# Patient Record
Sex: FEMALE | Race: WHITE | Employment: UNEMPLOYED | URBAN - METROPOLITAN AREA
[De-identification: names, ages, dates, MRNs, and addresses within clinical notes are randomized per-mention and may not be internally consistent; named-entity substitution may affect disease eponyms.]

---

## 2017-01-30 ENCOUNTER — HOSPITAL ENCOUNTER (EMERGENCY)
Age: 32
Discharge: HOME | End: 2017-01-30
Attending: EMERGENCY MEDICINE
Payer: MEDICAID

## 2017-01-30 VITALS
WEIGHT: 283 LBS | HEIGHT: 63 IN | BODY MASS INDEX: 50.14 KG/M2 | RESPIRATION RATE: 18 BRPM | DIASTOLIC BLOOD PRESSURE: 49 MMHG | HEART RATE: 106 BPM | SYSTOLIC BLOOD PRESSURE: 133 MMHG | TEMPERATURE: 98 F | OXYGEN SATURATION: 98 %
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
## 2017-01-30 DIAGNOSIS — S39.012A BACK STRAIN, INITIAL ENCOUNTER: ICD-10-CM

## 2017-01-30 PROCEDURE — 99283 EMERGENCY DEPT VISIT LOW MDM: CPT

## 2017-01-30 RX ORDER — CYCLOBENZAPRINE HCL 10 MG
10 TABLET ORAL 3 TIMES DAILY PRN
Qty: 20 TABLET | Refills: 0 | Status: SHIPPED | OUTPATIENT
Start: 2017-01-30 | End: 2017-02-06

## 2017-01-30 RX ORDER — CYCLOBENZAPRINE HCL 10 MG
10 TABLET ORAL ONCE
Status: COMPLETED | OUTPATIENT
Start: 2017-01-30 | End: 2017-01-30
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
## 2024-11-01 ENCOUNTER — HOSPITAL ENCOUNTER (OUTPATIENT)
Age: 39
Discharge: HOME | End: 2024-11-01
Payer: MEDICAID

## 2024-11-01 ENCOUNTER — APPOINTMENT (OUTPATIENT)
Dept: GENERAL RADIOLOGY | Age: 39
End: 2024-11-01
Attending: NURSE PRACTITIONER
Payer: MEDICAID

## 2024-11-01 VITALS
HEART RATE: 90 BPM | OXYGEN SATURATION: 98 % | TEMPERATURE: 98 F | DIASTOLIC BLOOD PRESSURE: 100 MMHG | RESPIRATION RATE: 20 BRPM | WEIGHT: 293 LBS | BODY MASS INDEX: 51.91 KG/M2 | HEIGHT: 63 IN | SYSTOLIC BLOOD PRESSURE: 139 MMHG

## 2024-11-01 DIAGNOSIS — S93.402A SPRAIN OF LEFT ANKLE, UNSPECIFIED LIGAMENT, INITIAL ENCOUNTER: Primary | ICD-10-CM

## 2024-11-01 PROCEDURE — 99205 OFFICE O/P NEW HI 60 MIN: CPT

## 2024-11-01 PROCEDURE — 73610 X-RAY EXAM OF ANKLE: CPT | Performed by: NURSE PRACTITIONER

## 2024-11-01 PROCEDURE — 99204 OFFICE O/P NEW MOD 45 MIN: CPT

## 2024-11-01 NOTE — ED INITIAL ASSESSMENT (HPI)
Yesterday, pt rolled onto her left ankle after a misstep on uneven ground. C/o pain and swelling. Pt is non weight bearing on the left foot.

## 2024-11-02 NOTE — ED PROVIDER NOTES
Patient Seen in: [facility]      History     Chief Complaint   Patient presents with    Ankle Pain     Stated Complaint: 1/2 left leg pain    Subjective:   The history is provided by the patient.       Patient is a 39-year-old female with diabetes and obesity here for evaluation of left ankle pain.  Patient was walking on uneven ground this morning when she invertedly rolled her ankle.  Since injury, patient has had lateral ankle swelling, pain with palpation and weightbearing activity.  Has taken Tylenol with minimal relief in pain.  Differentials include but are not limited to sprain versus fracture.      Objective:     Past Medical History:    Diabetes (HCC)    Fatty liver    Hyperlipidemia              Past Surgical History:   Procedure Laterality Date    Other  GASTRIC SLEEVE    Skin tissue procedure unlisted                  Social History     Socioeconomic History    Marital status: Single   Tobacco Use    Smoking status: Never     Passive exposure: Never    Smokeless tobacco: Never   Vaping Use    Vaping status: Never Used   Substance and Sexual Activity    Alcohol use: No    Drug use: No   Other Topics Concern    Caffeine Concern No    Exercise Yes     Comment: walking 7x a wk     Social Drivers of Health      Received from [facility]    Housing Stability              Review of Systems    Positive for stated complaint: 1/2 left leg pain  Other systems are as noted in HPI.  Constitutional and vital signs reviewed.      All other systems reviewed and negative except as noted above.    Physical Exam     ED Triage Vitals [11/01/24 1819]   BP (!) 139/100   Pulse 90   Resp 20   Temp 98.2 °F (36.8 °C)   Temp src Oral   SpO2 98 %   O2 Device None (Room air)       Current Vitals:   Vital Signs  BP: (!) 139/100  Pulse: 90  Resp: 20  Temp: 98.2 °F (36.8 °C)  Temp src: Oral    Oxygen Therapy  SpO2: 98 %  O2 Device: None (Room air)        Physical Exam  Vitals and nursing note  reviewed.   Constitutional:       General: She is not in acute distress.     Appearance: She is obese. She is not ill-appearing, toxic-appearing or diaphoretic.   HENT:      Mouth/Throat:      Mouth: Mucous membranes are moist.   Eyes:      Conjunctiva/sclera: Conjunctivae normal.      Pupils: Pupils are equal, round, and reactive to light.   Cardiovascular:      Rate and Rhythm: Normal rate.      Pulses: Normal pulses.   Pulmonary:      Effort: Pulmonary effort is normal.   Musculoskeletal:      Right lower leg: Normal.      Right ankle: Tenderness present over the lateral malleolus and posterior TF ligament. Decreased range of motion. Anterior drawer test negative. Normal pulse.      Right Achilles Tendon: Normal.      Right foot: Normal.   Neurological:      Mental Status: She is alert.           ED Course   Labs Reviewed - No data to display  XR ANKLE (MIN 3 VIEWS), LEFT (CPT=73610)    Result Date: 11/1/2024  CONCLUSION:  There is soft tissue swelling about the left ankle most pronounced overlying the lateral malleolus.  No acute fracture or dislocation visualized.   LOCATION:  [id number]   Dictated by (CST): Shelby Clifford MD on 11/01/2024 at 8:15 PM     Finalized by (CST): Shelby Clifford MD on 11/01/2024 at 8:15 PM                  [facility]            Medical Decision Making  Differentials include but are not limited to ankle sprain versus fracture.  X-rays unremarkable for acute findings which I personally reviewed, I do not observe obvious fracture or dislocation.  Ace bandage applied.  Crutches for mobility assistance.  Plan for supportive treatment including rest, ice, Tylenol and elevation.  Close outpatient follow-up with Ortho as needed.  Patient agrees with plan of care.  All questions answered to patient's satisfaction.    Amount and/or Complexity of Data Reviewed  Radiology: ordered and independent interpretation performed. Decision-making details documented in ED Course.        Disposition and Plan      Clinical Impression:  1. Sprain of left ankle, unspecified ligament, initial encounter         Disposition:  Discharge  11/1/2024  8:17 pm    Follow-up:  Rayna Francis, PA  [address] [place] [id number] [phone]    Schedule an appointment as soon as possible for a visit   As needed          Medications Prescribed:  Discharge Medication List as of 11/1/2024  8:34 PM              Supplementary Documentation:

## (undated) NOTE — ED AVS SNAPSHOT
THE [facility] Emergency Department in [address] [facility]    Phone:  [phone]    Fax:  [address]   MRN: [id number]    Department:  THE [facility] Emergency Department in [place]   Date of Visit: IF THERE IS ANY CHANGE OR WORSENING OF YOUR CONDITION, CALL YOUR PRIMARY CARE PHYSICIAN AT ONCE OR RETURN IMMEDIATELY TO THE EMERGENCY DEPARTMENT.     If you have been prescribed any medication(s), please fill your prescription right away and begin taking t

## (undated) NOTE — ED AVS SNAPSHOT
THE [facility] Emergency Department in [address] [facility]    Phone:  [phone]    Fax:  [address]   MRN: [id number]    Department:  THE [facility] Emergency Department in [place]   Date of Visit: Follow up with your primary care doctor  Return to ER if any worsening symptoms or new concern    Disclosure     Insurance plans vary and the physician(s) referred by the ER may not be covered by your plan.  Please contact your insurance company to determin If you have been prescribed any medication(s), please fill your prescription right away and begin taking the medication(s) as directed    If the emergency physician has read X-rays, these will be re-interpreted by a radiologist.  If there is a significant can help with your Affordable Care Act coverage, as well as all types of Medicaid plans. To get signed up and covered, please call [phone] and ask to get set up for an insurance coverage that is in-network with [facility]